# Patient Record
Sex: FEMALE | Race: WHITE | NOT HISPANIC OR LATINO | Employment: UNEMPLOYED | URBAN - METROPOLITAN AREA
[De-identification: names, ages, dates, MRNs, and addresses within clinical notes are randomized per-mention and may not be internally consistent; named-entity substitution may affect disease eponyms.]

---

## 2017-01-25 ENCOUNTER — PATIENT MESSAGE (OUTPATIENT)
Dept: NEUROSURGERY | Facility: CLINIC | Age: 47
End: 2017-01-25

## 2017-03-20 ENCOUNTER — TELEPHONE (OUTPATIENT)
Dept: NEUROSURGERY | Facility: CLINIC | Age: 47
End: 2017-03-20

## 2017-03-20 DIAGNOSIS — Z98.890 STATUS POST LAMINECTOMY: ICD-10-CM

## 2017-03-20 DIAGNOSIS — D49.7 SPINAL CORD TUMOR: Primary | ICD-10-CM

## 2017-03-20 NOTE — TELEPHONE ENCOUNTER
----- Message from Mary Carmen Hemphill sent at 3/20/2017  2:29 PM CDT -----  Contact: Patient [phone]  Patient is calling to speak with nurse about some severe pain she is having. Please call

## 2017-03-24 ENCOUNTER — OFFICE VISIT (OUTPATIENT)
Dept: NEUROSURGERY | Facility: CLINIC | Age: 47
End: 2017-03-24
Payer: COMMERCIAL

## 2017-03-24 ENCOUNTER — HOSPITAL ENCOUNTER (OUTPATIENT)
Dept: RADIOLOGY | Facility: HOSPITAL | Age: 47
Discharge: HOME | End: 2017-03-24
Attending: NEUROLOGICAL SURGERY
Payer: COMMERCIAL

## 2017-03-24 VITALS
WEIGHT: 262.31 LBS | SYSTOLIC BLOOD PRESSURE: 146 MMHG | HEART RATE: 93 BPM | HEIGHT: 64 IN | TEMPERATURE: 99 F | BODY MASS INDEX: 44.78 KG/M2 | DIASTOLIC BLOOD PRESSURE: 87 MMHG

## 2017-03-24 DIAGNOSIS — D49.7 SPINAL CORD TUMOR: ICD-10-CM

## 2017-03-24 DIAGNOSIS — Z98.890 STATUS POST LAMINECTOMY: ICD-10-CM

## 2017-03-24 DIAGNOSIS — Z98.890 H/O LAMINECTOMY: ICD-10-CM

## 2017-03-24 DIAGNOSIS — S22.000A THORACIC COMPRESSION FRACTURE, CLOSED, INITIAL ENCOUNTER: Primary | ICD-10-CM

## 2017-03-24 PROCEDURE — 72070 X-RAY EXAM THORAC SPINE 2VWS: CPT | Mod: TC

## 2017-03-24 PROCEDURE — 72100 X-RAY EXAM L-S SPINE 2/3 VWS: CPT | Mod: TC

## 2017-03-24 PROCEDURE — 99214 OFFICE O/P EST MOD 30 MIN: CPT | Mod: S$GLB,,, | Performed by: PHYSICIAN ASSISTANT

## 2017-03-24 PROCEDURE — 72100 X-RAY EXAM L-S SPINE 2/3 VWS: CPT | Mod: 26,,, | Performed by: RADIOLOGY

## 2017-03-24 PROCEDURE — 72120 X-RAY BEND ONLY L-S SPINE: CPT | Mod: 26,,, | Performed by: RADIOLOGY

## 2017-03-24 PROCEDURE — 72070 X-RAY EXAM THORAC SPINE 2VWS: CPT | Mod: 26,,, | Performed by: RADIOLOGY

## 2017-03-24 PROCEDURE — 99999 PR PBB SHADOW E&M-EST. PATIENT-LVL IV: CPT | Mod: PBBFAC,,, | Performed by: PHYSICIAN ASSISTANT

## 2017-03-24 RX ORDER — ARM BRACE
1 EACH MISCELLANEOUS DAILY
COMMUNITY
Start: 2017-03-24 | End: 2017-03-24 | Stop reason: SDUPTHER

## 2017-03-24 RX ORDER — ARM BRACE
1 EACH MISCELLANEOUS DAILY
Qty: 1 EACH | Refills: 0 | Status: SHIPPED | OUTPATIENT
Start: 2017-03-24 | End: 2019-03-25

## 2017-03-24 NOTE — MR AVS SNAPSHOT
[facility] - Neurosurgery   [address]  [facility] [address]  Phone: [phone]                  Tiffanie Espinoza   3/24/2017 2:20 PM   Office Visit    Description:  Female : 1970   Provider:  MORIAH Hawthorne   Department:  [facility] - Neurosurgery 7th Fl           Reason for Visit     Lumbar Spine Pain (L-Spine)           Diagnoses this Visit        Comments    Thoracic compression fracture, closed, initial encounter    -  Primary     H/O laminectomy                To Do List           Goals (5 Years of Data)     None       These Medications        Disp Refills Start End    back brace Misc 1 each 0 3/24/2017     1 Units by Misc.(Non-Drug; Combo Route) route once daily. - Misc.(Non-Drug; Combo Route)    Pharmacy: [facility] [place] [facility] #: [phone]       Notes to Pharmacy: TLSO Brace      [facility] On Call     [facility] On Call Nurse Care Line -  Assistance  Registered nurses in the [facility] On Call Center provide clinical advisement, health education, appointment booking, and other advisory services.  Call for this free service at [phone].             Medications           Message regarding Medications     Verify the changes and/or additions to your medication regime listed below are the same as discussed with your clinician today.  If any of these changes or additions are incorrect, please notify your healthcare provider.        START taking these NEW medications        Refills    back brace Misc 0    Si Units by Misc.(Non-Drug; Combo Route) route once daily.    Class: Print    Route: Misc.(Non-Drug; Combo Route)           Verify that the below list of medications is an accurate representation of the medications you are currently taking.  If none reported, the list may be blank. If incorrect, please contact your healthcare provider. Carry this list with you in case of emergency.           Current Medications      "atorvastatin (LIPITOR) 20 MG tablet Take 20 mg by mouth once daily.    diazePAM (VALIUM) 5 MG tablet Take 1 tablet (5 mg total) by mouth every 6 (six) hours as needed (muscle spasm).    insulin aspart (NOVOLOG FLEXPEN) 100 unit/mL InPn pen 8 units before breakfast  10 units before lunch and 10 units before dinner    insulin needles, disposable, 32 x 5/32 " Ndle 1 each by Misc.(Non-Drug; Combo Route) route 3 (three) times daily.    ONETOUCH DELICA LANCETS 33 gauge Misc 1 lancet by Misc.(Non-Drug; Combo Route) route 4 (four) times daily.    ONETOUCH VERIO Strp 1 strip by Misc.(Non-Drug; Combo Route) route 4 (four) times daily.    ranitidine (ZANTAC) 75 MG tablet Take 75 mg by mouth as needed for Heartburn. Taking every other day    simethicone (MYLICON) 80 MG chewable tablet Take 80 mg by mouth every 6 (six) hours as needed for Flatulence.    triamcinolone acetonide 0.1% (KENALOG) 0.1 % Lotn     VICTOZA 3-JUANITO 0.6 mg/0.1 mL (18 mg/3 mL) PnIj     back brace Misc 1 Units by Misc.(Non-Drug; Combo Route) route once daily.    insulin detemir (LEVEMIR FLEXTOUCH) 100 unit/mL (3 mL) SubQ InPn pen Inject 30 Units into the skin every evening.           Clinical Reference Information           Your Vitals Were     BP Pulse Temp Height Weight Last Period    146/87 93 98.5 °F (36.9 °C) (Oral) 5' 4" (1.626 m) 119 kg (262 lb 4.8 oz) 03/21/2017    BMI                45.02 kg/m2          Blood Pressure          Most Recent Value    BP  (!)  146/87      Allergies as of 3/24/2017     Ciprofloxacin (Bulk)      Immunizations Administered on Date of Encounter - 3/24/2017     None      Orders Placed During Today's Visit     Future Labs/Procedures Expected by Expires    MRI Lumbar Spine W WO Contrast  3/24/2017 3/24/2018      Smoking Cessation     If you would like to quit smoking:   You may be eligible for free services if you are a [place] resident and started smoking cigarettes before September 1, 1988.  Call the Smoking Cessation Trust " (SCT) toll free at (127) 933-9490 or (960) 037-3778.   Call 1-800-QUIT-NOW if you do not meet the above criteria.            Language Assistance Services     ATTENTION: Language assistance services are available, free of charge. Please call [phone].      ATENCIÓN: Si habla margarita, tiene a corrales disposición servicios gratuitos de asistencia lingüística. Llame al [phone].     CHÚ Ý: N?u b?n nói Ti?ng Vi?t, có các d?ch v? h? tr? ngôn ng? mi?n phí dành cho b?n. G?i s? [phone].         [facility] [address] complies with applicable Federal civil rights laws and does not discriminate on the basis of race, color, national origin, age, disability, or sex.

## 2017-03-24 NOTE — LETTER
March 30, 2017      Lexus Price, FNP  [address]  [place] [address]           [facility] - Neurosurgery 7th Fl  [address]  [facility] [address]  Phone: [phone]          Patient: Tiffanie Espinoza   MR Number: [id number]   YOB: 1970   Date of Visit: 3/24/2017       Dear Lexus Price:    Thank you for referring Tiffanie Espinoza to me for evaluation. Attached you will find relevant portions of my assessment and plan of care.    If you have questions, please do not hesitate to call me. I look forward to following Tiffanie Espinoza along with you.    Sincerely,    MORIAH Hawthorne    Enclosure  CC:  No Recipients    If you would like to receive this communication electronically, please contact [email or web address] or [phone] to request more information on [employer] Link access.    For providers and/or their staff who would like to refer a patient to [facility], please contact us through our one-stop-shop provider referral line, [facility] , at [phone].    If you feel you have received this communication in error or would no longer like to receive these types of communications, please e-mail [email or web address]

## 2017-03-25 NOTE — PROGRESS NOTES
CHIEF COMPLAINT:  Increased back pain.    HISTORY OF PRESENT ILLNESS:  Ms. Espinoza is a 46-year-old female with history of a   thoracic laminectomy for resection of intrathoracic cystic lesion that was   resected by Dr. Yadav on 11/17/2015.  Pathology was negative for malignancy.  She   has been followed postoperatively by Dr. Yadav, was last seen on 12/06/2016.  At   that time, the patient had some complaints of continued left lower extremity   weakness and intermittent urinary incontinence.  The MRI showed residual cyst   without new growth and size of the cyst had decreased postoperatively.  The   patient was scheduled for followup in one year with MRI.  Since last visit, she   does report increased mid back pain over the past two weeks.  Primarily, she   states that she did have a fall around [place] where she tripped on a root on   a [place] parade and fell on her back.  She had some mild increase of   discomfort at that time.  However, two weeks ago, she was at home and says she   was doing normal daily activities and had a sharp increase of mid back pain.  It   has somewhat subsided, but still worse than it had been.  She reports some   continued paresthesias in her legs as well as continued left-sided weakness more   or less unchanged compared to prior.  Denies any bowel or bladder changes.    PHYSICAL EXAMINATION:  GENERAL:  The patient is awake, alert, oriented.  NEUROLOGIC:  Cranial nerves II through XII are grossly intact.  Her gait is   normal.  She does have some mild midline tenderness to palpation in the middle   of the thoracic spine just above the prior surgery.  Strength in her lower   extremity is decreased at 4/5 in left dorsiflexion, otherwise 5/5.  She has   negative ankle clonus.  Toes are downgoing.    IMAGING:  X-rays of the thoracic spine that were done today show mild   compression deformity at T9, which was not present on prior studies.    ASSESSMENT AND PLAN:  Ms. Espnioza is a 46-year-old  female with history of thoracic   laminectomy for excisional biopsy of terminal conus cystic lesion now with mild   T9 compression fracture.  The patient is neurologically stable without any new   or worsening weakness.  Her primary complaint is increased back pain.  I will   give her a prescription for a TLSO brace.  She was advised to wear it at all   times except when flat in bed.  We will order an MRI of the thoracic spine with   and without contrast to further evaluate the fracture.  We will see her back   after the MRI is done.  She is encouraged to call the clinic with questions or   concerns in the meantime.    DICTATED BY:  GUSTAVO Nance Jr./EJ  dd: 03/24/2017 16:47:13 (CDT)  td: 03/25/2017 06:14:53 (CDT)  Doc ID   #[id number]  Job ID #[id number]    CC:

## 2017-03-28 ENCOUNTER — TELEPHONE (OUTPATIENT)
Dept: NEUROSURGERY | Facility: CLINIC | Age: 47
End: 2017-03-28

## 2017-03-28 DIAGNOSIS — D49.7 SPINAL CORD TUMOR: Primary | ICD-10-CM

## 2017-03-28 NOTE — TELEPHONE ENCOUNTER
----- Message from Arin Moya sent at 3/28/2017  9:43 AM CDT -----  Contact: [facility]    [phone]  Calling to req an order and appt for pt to have a lab for her kratanin level.  Pt is schedule to have her mri on Saturday 4-1-17.

## 2017-04-01 ENCOUNTER — HOSPITAL ENCOUNTER (OUTPATIENT)
Dept: RADIOLOGY | Facility: HOSPITAL | Age: 47
Discharge: HOME | End: 2017-04-01
Attending: NEUROLOGICAL SURGERY
Payer: COMMERCIAL

## 2017-04-01 DIAGNOSIS — Z98.890 H/O LAMINECTOMY: ICD-10-CM

## 2017-04-01 DIAGNOSIS — S22.000A THORACIC COMPRESSION FRACTURE, CLOSED, INITIAL ENCOUNTER: ICD-10-CM

## 2017-04-01 RX ORDER — GADOBUTROL 604.72 MG/ML
INJECTION INTRAVENOUS
Status: DISCONTINUED
Start: 2017-04-01 | End: 2017-04-01 | Stop reason: WASHOUT

## 2017-04-04 ENCOUNTER — PATIENT MESSAGE (OUTPATIENT)
Dept: NEUROSURGERY | Facility: CLINIC | Age: 47
End: 2017-04-04

## 2017-04-13 ENCOUNTER — HOSPITAL ENCOUNTER (OUTPATIENT)
Dept: RADIOLOGY | Facility: HOSPITAL | Age: 47
Discharge: HOME | End: 2017-04-13
Attending: NEUROLOGICAL SURGERY
Payer: COMMERCIAL

## 2017-04-13 PROCEDURE — 72158 MRI LUMBAR SPINE W/O & W/DYE: CPT | Mod: 26,,, | Performed by: RADIOLOGY

## 2017-04-13 PROCEDURE — A9585 GADOBUTROL INJECTION: HCPCS | Performed by: NEUROLOGICAL SURGERY

## 2017-04-13 PROCEDURE — 72158 MRI LUMBAR SPINE W/O & W/DYE: CPT | Mod: TC

## 2017-04-13 PROCEDURE — 25500020 PHARM REV CODE 255: Performed by: NEUROLOGICAL SURGERY

## 2017-04-13 RX ORDER — GADOBUTROL 604.72 MG/ML
10 INJECTION INTRAVENOUS
Status: COMPLETED | OUTPATIENT
Start: 2017-04-13 | End: 2017-04-13

## 2017-04-13 RX ADMIN — GADOBUTROL 10 ML: 604.72 INJECTION INTRAVENOUS at 01:04

## 2017-04-17 ENCOUNTER — PATIENT MESSAGE (OUTPATIENT)
Dept: NEUROSURGERY | Facility: CLINIC | Age: 47
End: 2017-04-17

## 2017-04-20 ENCOUNTER — OFFICE VISIT (OUTPATIENT)
Dept: NEUROSURGERY | Facility: CLINIC | Age: 47
End: 2017-04-20
Payer: COMMERCIAL

## 2017-04-20 VITALS
HEIGHT: 64 IN | BODY MASS INDEX: 43.64 KG/M2 | TEMPERATURE: 99 F | DIASTOLIC BLOOD PRESSURE: 91 MMHG | SYSTOLIC BLOOD PRESSURE: 138 MMHG | HEART RATE: 98 BPM | WEIGHT: 255.63 LBS

## 2017-04-20 DIAGNOSIS — Z98.890 H/O LAMINECTOMY: Primary | ICD-10-CM

## 2017-04-20 DIAGNOSIS — D49.2 THORACIC SPINE TUMOR: ICD-10-CM

## 2017-04-20 PROCEDURE — 99214 OFFICE O/P EST MOD 30 MIN: CPT | Mod: S$GLB,,, | Performed by: PHYSICIAN ASSISTANT

## 2017-04-20 PROCEDURE — 99999 PR PBB SHADOW E&M-EST. PATIENT-LVL IV: CPT | Mod: PBBFAC,,, | Performed by: PHYSICIAN ASSISTANT

## 2017-04-20 NOTE — LETTER
April 23, 2017      Angel Angel MD  [address]  [address]  [facility] [place] [address]           [facility] - Neurosurgery 7th Fl  [address]  [facility] [address]  Phone: [phone]          Patient: Tiffanie Espinoza   MR Number: [id number]   YOB: 1970   Date of Visit: 4/20/2017       Dear Dr. Angel Angel:    Thank you for referring Tiffanie Espinoza to me for evaluation. Attached you will find relevant portions of my assessment and plan of care.    If you have questions, please do not hesitate to call me. I look forward to following Tiffanie Espinoza along with you.    Sincerely,    MORIAH Hawthorne    Enclosure  CC:  No Recipients    If you would like to receive this communication electronically, please contact [email or web address] or [phone] to request more information on [employer] Link access.    For providers and/or their staff who would like to refer a patient to [facility], please contact us through our one-stop-shop provider referral line, [facility], at [phone].    If you feel you have received this communication in error or would no longer like to receive these types of communications, please e-mail [email or web address]

## 2017-04-21 NOTE — PROGRESS NOTES
CHIEF COMPLAINT:  Thoracic compression deformity.    HISTORY OF PRESENT ILLNESS:  Ms. Espinoza is a 47-year-old female with history of   thoracic laminectomy for resection of intrathoracic cystic lesion that was   resected by Dr. Yadav on 11/17/2015.  Pathology was negative for malignancy.  She   presented to me on 03/24/2017 with increased mid back pain.  She had had a fall   around [place] where she tripped and fell on her back.  X-ray at that time   showed mild compression deformity at T9.  She was neurologically intact.  Again   has baseline longstanding left dorsiflexion weakness.  Otherwise, no new issues   related to the fall.  We got her a TLSO brace and scheduled for an MRI scan.    She is here today.  She reports overall gradually improving back pain.  She does   have continued stable back pain that has not completely gone away since   surgery.  She denies any new pain, paresthesias or weakness in her lower   extremities.  She denies balance difficulties, has weakness in her legs.  She   denies bowel or bladder dysfunction.    PHYSICAL EXAMINATION:  GENERAL:  The patient is awake, alert, oriented, in no apparent distress.  NEUROLOGIC:  Cranial nerves II through XII are grossly intact.  Her gait is   normal.  Her strength remains decreased at left dorsiflexion at 4/5.  Otherwise,   her strength is full.  Toes are downgoing.  Negative ankle clonus.  Sensation   is intact.    IMAGING:  MRI of the lumbar spine that was obtained on 04/13/2017 that includes   the lower thoracic spine shows stable changes of the cystic lesion at T12-L1 as   well as no evidence of acute fracture at T9.  The spinal canal does not exhibit   any significant central or foraminal stenosis.    ASSESSMENT AND PLAN:  Ms. Espinoza is a 47-year-old female with history of   laminectomy for excisional biopsy of terminal conus cystic lesion with mild T9   compression fracture.  MRI does not show any STIR signal to suggest acute   fracture.  The  patient's back pain is back to her normal baseline.  She remains   neurologically intact.  She can wear the TLSO brace only for comfort.  Discussed   with patient about smoking cessation as well as weight loss as far as helping   to improve her back pain and gave her a prescription for physical therapy.  We   will, however, plan to have her follow up as scheduled in one year with a repeat   MRI with Dr. Yadav.  She was encouraged to call our clinic with any worsening   problems or questions or concerns in the meantime.    DICTATED BY:  Salvador Sethi Jr., GUSTAVO.      TRACI/EJ  dd: 04/20/2017 14:36:09 (CDT)  td: 04/21/2017 03:01:42 (CDT)  Doc ID   #[id number]  Job ID #[id number]    CC:

## 2017-10-11 ENCOUNTER — PATIENT MESSAGE (OUTPATIENT)
Dept: NEUROSURGERY | Facility: CLINIC | Age: 47
End: 2017-10-11

## 2017-10-11 ENCOUNTER — TELEPHONE (OUTPATIENT)
Dept: NEUROSURGERY | Facility: CLINIC | Age: 47
End: 2017-10-11

## 2017-10-11 DIAGNOSIS — M54.9 BACK PAIN, UNSPECIFIED BACK LOCATION, UNSPECIFIED BACK PAIN LATERALITY, UNSPECIFIED CHRONICITY: Primary | ICD-10-CM

## 2017-11-14 ENCOUNTER — OFFICE VISIT (OUTPATIENT)
Dept: NEUROSURGERY | Facility: CLINIC | Age: 47
End: 2017-11-14
Payer: COMMERCIAL

## 2017-11-14 ENCOUNTER — HOSPITAL ENCOUNTER (OUTPATIENT)
Dept: RADIOLOGY | Facility: HOSPITAL | Age: 47
Discharge: HOME | End: 2017-11-14
Attending: NEUROLOGICAL SURGERY
Payer: COMMERCIAL

## 2017-11-14 VITALS
WEIGHT: 247 LBS | DIASTOLIC BLOOD PRESSURE: 80 MMHG | SYSTOLIC BLOOD PRESSURE: 145 MMHG | TEMPERATURE: 99 F | BODY MASS INDEX: 42.17 KG/M2 | HEART RATE: 89 BPM | HEIGHT: 64 IN

## 2017-11-14 DIAGNOSIS — D49.2 THORACIC SPINE TUMOR: Primary | ICD-10-CM

## 2017-11-14 DIAGNOSIS — G95.9 CERVICAL MYELOPATHY: ICD-10-CM

## 2017-11-14 DIAGNOSIS — M54.9 BACK PAIN, UNSPECIFIED BACK LOCATION, UNSPECIFIED BACK PAIN LATERALITY, UNSPECIFIED CHRONICITY: ICD-10-CM

## 2017-11-14 PROCEDURE — 72070 X-RAY EXAM THORAC SPINE 2VWS: CPT | Mod: 26,,, | Performed by: RADIOLOGY

## 2017-11-14 PROCEDURE — 99214 OFFICE O/P EST MOD 30 MIN: CPT | Mod: S$GLB,,, | Performed by: PHYSICIAN ASSISTANT

## 2017-11-14 PROCEDURE — 72070 X-RAY EXAM THORAC SPINE 2VWS: CPT | Mod: TC

## 2017-11-14 PROCEDURE — 3008F BODY MASS INDEX DOCD: CPT | Mod: CPTII,S$GLB,, | Performed by: PHYSICIAN ASSISTANT

## 2017-11-14 PROCEDURE — 99999 PR PBB SHADOW E&M-EST. PATIENT-LVL IV: CPT | Mod: PBBFAC,,, | Performed by: PHYSICIAN ASSISTANT

## 2017-11-14 RX ORDER — OXYCODONE AND ACETAMINOPHEN 10; 325 MG/1; MG/1
1 TABLET ORAL
COMMUNITY
Start: 2017-10-23 | End: 2017-11-22

## 2017-11-14 RX ORDER — TIZANIDINE 4 MG/1
4 TABLET ORAL
COMMUNITY
Start: 2017-08-24 | End: 2017-12-04

## 2017-11-14 RX ORDER — GABAPENTIN 300 MG/1
300 CAPSULE ORAL
COMMUNITY
Start: 2017-08-24 | End: 2017-12-04

## 2017-11-14 RX ORDER — ASPIRIN 325 MG
TABLET, DELAYED RELEASE (ENTERIC COATED) ORAL
Refills: 0 | COMMUNITY
Start: 2017-08-22 | End: 2018-10-05

## 2017-11-14 RX ORDER — METFORMIN HYDROCHLORIDE 500 MG/1
1000 TABLET, EXTENDED RELEASE ORAL
COMMUNITY
Start: 2017-09-28 | End: 2018-09-28

## 2017-11-14 NOTE — PROGRESS NOTES
CHIEF COMPLAINT:  History of thoracic cystic lesion.    HISTORY OF PRESENT ILLNESS:  Ms. Tiffanie Espinoza is a 47-year-old female with   history of thoracic laminectomy at T12 for resection of intradural   intramedullary thoracic cyst that was done by Dr. Yadav on 08/13/2015.  The   patient subsequently had wound dehiscence requiring a revision a month later on   11/24/2015, pathology from the lesion was negative.  The patient has baseline   left dorsiflexion weakness, otherwise strength has been stable.  I last saw her   on 11/15/2017.  At that time, the patient had some questionable signs of   cervical myelopathy as well as continued back pain.  It has been a year since   her previous imaging studies, so we had her back with an MRI of her cervical and   thoracic spine today.  Currently, she denies any new complaints.  She reports   continued intermittent paresthesias and the upper extremities, primarily; on the   right, she denies any new weakness.  Hand function has been stable; balance   problems, unchanged.  Denies bowel or bladder function.    PHYSICAL EXAMINATION:  GENERAL:  The patient is awake, alert, oriented, in no apparent distress.  NEUROLOGIC:  Cranial nerves II-XII are grossly intact.  She has mild difficulty   with tandem gait.  Strength in upper extremities is 5/5 bilaterally in deltoids,   biceps, triceps, wrist flexion, wrist extension and hand .  She has subtle   Lakisha sign bilaterally.  Strength in lower extremities is 5/5 in bilateral   hip flexion, knee flexion, knee extension, dorsiflexion, plantarflexion and EHL   on the left is 4/5 and on the right is full strength at 5/5.  Baseline decreased   sensation to light touch on top of the left foot.    IMAGING:  MRI of the cervical and thoracic spine with and without contrast,   personally reviewed today.    ASSESSMENT AND PLAN:  Ms. Espinoza is a 47-year-old female with history of thoracic   laminectomy and T12 for excisional biopsy of terminal  clonus cystic lesion in   August of 2005.  Pathology was negative for malignancy.  The patient's MRI scans   today; thoracic MRI shows stable size of conus lesion compared to previous   study.  There are no new lesions appreciable on the thoracic spine.  MRI of the   cervical spine was also obtained today, shows mild multilevel degenerative   changes.  There is a paracentral disk herniation at C3-C4 on the right causing a   mild-to-moderate degree of foraminal stenosis.  This is also the case at C4-C5   to lesser degree.  No evidence of enhancing lesions in the cervical cord, no   significant degree of central canal stenosis.  At this point in time, we will   have her continue to follow up with Pain Management and discussed her that we   can get her in to another round of physical therapy since she does continue to   deal with intermittent back pain.  She wants to hold off on this at this time.    From our standpoint, we will plan on seeing her back in Neurosurgery Clinic in   one year with repeat imaging of the thoracic spine.  Currently, she is   neurologically stable with intermittent paresthesias in her upper extremities.    No focal weakness in her arms.  Her small disk herniation will be retreated   conservatively.  She is advised if she develops any new or worsening problems,   to give us a call in the meantime.  Otherwise, we will see her back in a year.    DICTATED BY:  Salvador Sethi Jr., GUSTAVO AVILES/IN  dd: 12/04/2017 09:07:24 (CST)  td: 12/05/2017 05:46:49 (CST)  Doc ID   #[id number]  Job ID #[id number]    CC:

## 2017-11-14 NOTE — LETTER
November 14, 2017      Angel Angel MD  [address]  [address]  [facility] [place] [address]           [facility] - Neurosurgery 7th Fl  [address]  [facility] [address]  Phone: [phone]          Patient: Tiffanie Espinoza   MR Number: [id number]   YOB: 1970   Date of Visit: 11/14/2017       Dear Dr. Angel Angel:    Thank you for referring Tiffanie Espinoza to me for evaluation. Attached you will find relevant portions of my assessment and plan of care.    If you have questions, please do not hesitate to call me. I look forward to following Tiffanie Espinoza along with you.    Sincerely,    MORIAH Hawthorne    Enclosure  CC:  No Recipients    If you would like to receive this communication electronically, please contact [email or web address] or [phone] to request more information on [employer] Link access.    For providers and/or their staff who would like to refer a patient to [facility], please contact us through our one-stop-shop provider referral line, [facility], at [phone].    If you feel you have received this communication in error or would no longer like to receive these types of communications, please e-mail [email or web address]

## 2017-11-15 NOTE — PROGRESS NOTES
CHIEF COMPLAINT:  Balance difficulty and history of cystic spinal cord lesion.    HISTORY OF PRESENT ILLNESS:  Ms. 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 Yadav on 08/13/2015.  The patient subsequently   had wound dehiscence and requiring revision a month later on 11/24/2015.    Pathology from the lesion was negative for malignancy.  The patient overall has   done well postoperatively.  She has had baseline left dorsiflexion weakness;   otherwise, has improved.  I last saw her on 04/20/2017.  At that time, the   patient had a fall from the back of a truck, sustaining a mild T9 compression   fracture.  At that visit, her strength was stable.  She had a repeat MRI that   was not consistent with an acute fracture.  Additionally, the MRI scan showed   stable cystic lesion at the terminus of the conus compared to the previous   study.  The patient comes in today for several complaints that have all occurred   over the past month.  She states that she has noticed increased pain and   paresthesias in the hands bilaterally.  She also noticed some of the function of   her hands has difficulty, dropping things and writing.  She reports increased   numbness in the right foot that has also accompanied with shooting pain   intermittently.  The patient states that in addition to this about a month ago   she has had three to four episodes of urinary incontinence, one of them was   related when she was awake after sneezing, however, another occurred in the   middle of the night and she woke up and the bed was wet.  Again, this has   happened in the past month, three to four times.  Currently, she feels her   strength is overall about the same, but she does feel that her balance is   somewhat worsening.    PHYSICAL EXAMINATION:  GENERAL:  The patient is awake, alert, oriented, in no apparent  distress.  NEUROLOGIC:  Pupils are equal, round and reactive to light.  Extraocular   movements are intact.  Face is symmetric.  Tongue is midline.  Her gait is she   favors the right side.  She does have difficulty with tandem gait.  Strength in   her upper extremities is 5/5 at deltoids, biceps, triceps, wrist flexion, wrist   extension and 4+/5 in handgrip.  She has bilateral Lakisha sign.  Strength in   her lower extremities is 5/5 at hip flexion, knee flexion, knee extension,   dorsiflexion, plantar flexion on the right, but plantar flexion on the left is   4/5, EHL 4/5, plantar flexion on the right is 5/5.  She has one beat of ankle   clonus on the right, two on the left.  Toes are downgoing.  Decreased sensation   to light touch on the left foot.    IMAGING:  No recent.    ASSESSMENT AND PLAN:  Ms. Espinoza is a 47-year-old female with history of thoracic   laminectomy at T12 for excisional biopsy of terminal clonus cystic lesion that   was done in August 2015.  Pathology was negative for malignancy.  The patient is   now presenting with increased hand numbness as well as intrinsic hand   difficulty.  She has a positive Lakisha sign, difficulty with tandem gait.    Given her history and clinical findings, we will get an updated MRI scan of the   thoracic spine and also an MRI scan of the cervical spine.  She has signs and   symptoms concerning for cervical myelopathy.  We will do this with and without   contrast.  We will see her back after this is done.  She was encouraged to call   the clinic with questions or concerns in the meantime.    DICTATED BY:  Salvador Sethi Jr., GUSTAVO.      LUISA  dd: 11/14/2017 11:00:02 (CST)  td: 11/15/2017 03:19:30 (CST)  Doc ID   #[id number]  Job ID #[id number]    CC:

## 2017-12-04 ENCOUNTER — OFFICE VISIT (OUTPATIENT)
Dept: NEUROSURGERY | Facility: CLINIC | Age: 47
End: 2017-12-04
Payer: COMMERCIAL

## 2017-12-04 ENCOUNTER — HOSPITAL ENCOUNTER (OUTPATIENT)
Dept: RADIOLOGY | Facility: HOSPITAL | Age: 47
Discharge: HOME | End: 2017-12-04
Attending: PHYSICIAN ASSISTANT
Payer: COMMERCIAL

## 2017-12-04 VITALS
TEMPERATURE: 87 F | DIASTOLIC BLOOD PRESSURE: 79 MMHG | SYSTOLIC BLOOD PRESSURE: 122 MMHG | HEIGHT: 64 IN | BODY MASS INDEX: 42.17 KG/M2 | WEIGHT: 247 LBS

## 2017-12-04 DIAGNOSIS — D49.2 THORACIC SPINE TUMOR: ICD-10-CM

## 2017-12-04 DIAGNOSIS — G95.9 CERVICAL MYELOPATHY: ICD-10-CM

## 2017-12-04 DIAGNOSIS — Z98.890 S/P LAMINECTOMY: Primary | ICD-10-CM

## 2017-12-04 PROCEDURE — 3008F BODY MASS INDEX DOCD: CPT | Mod: CPTII,S$GLB,, | Performed by: PHYSICIAN ASSISTANT

## 2017-12-04 PROCEDURE — 72157 MRI CHEST SPINE W/O & W/DYE: CPT | Mod: 26,,, | Performed by: RADIOLOGY

## 2017-12-04 PROCEDURE — 99999 PR PBB SHADOW E&M-EST. PATIENT-LVL IV: CPT | Mod: PBBFAC,,, | Performed by: PHYSICIAN ASSISTANT

## 2017-12-04 PROCEDURE — 99214 OFFICE O/P EST MOD 30 MIN: CPT | Mod: S$GLB,,, | Performed by: PHYSICIAN ASSISTANT

## 2017-12-04 PROCEDURE — 72156 MRI NECK SPINE W/O & W/DYE: CPT | Mod: TC

## 2017-12-04 PROCEDURE — 72157 MRI CHEST SPINE W/O & W/DYE: CPT | Mod: TC

## 2017-12-04 PROCEDURE — 25500020 PHARM REV CODE 255: Performed by: PHYSICIAN ASSISTANT

## 2017-12-04 PROCEDURE — 72156 MRI NECK SPINE W/O & W/DYE: CPT | Mod: 26,,, | Performed by: RADIOLOGY

## 2017-12-04 PROCEDURE — A9585 GADOBUTROL INJECTION: HCPCS | Performed by: PHYSICIAN ASSISTANT

## 2017-12-04 RX ORDER — LANOLIN ALCOHOL/MO/W.PET/CERES
1000 CREAM (GRAM) TOPICAL
COMMUNITY
Start: 2017-05-03 | End: 2018-05-03

## 2017-12-04 RX ORDER — DIAZEPAM 5 MG/1
5 TABLET ORAL
COMMUNITY
Start: 2017-08-24 | End: 2019-03-25

## 2017-12-04 RX ORDER — LISINOPRIL AND HYDROCHLOROTHIAZIDE 12.5; 2 MG/1; MG/1
1 TABLET ORAL DAILY
Refills: 1 | COMMUNITY
Start: 2017-11-19 | End: 2019-03-25

## 2017-12-04 RX ORDER — TIZANIDINE 4 MG/1
4 TABLET ORAL
COMMUNITY
Start: 2017-11-22 | End: 2018-10-05

## 2017-12-04 RX ORDER — GLIMEPIRIDE 4 MG/1
4 TABLET ORAL
COMMUNITY
Start: 2017-09-28 | End: 2019-03-25

## 2017-12-04 RX ORDER — METOPROLOL SUCCINATE 25 MG/1
25 TABLET, EXTENDED RELEASE ORAL
COMMUNITY
Start: 2017-05-03 | End: 2018-05-03

## 2017-12-04 RX ORDER — GABAPENTIN 300 MG/1
300 CAPSULE ORAL
COMMUNITY
Start: 2017-11-22 | End: 2018-11-22

## 2017-12-04 RX ORDER — OXYCODONE AND ACETAMINOPHEN 10; 325 MG/1; MG/1
1 TABLET ORAL
COMMUNITY
Start: 2018-01-21 | End: 2018-02-20

## 2017-12-04 RX ORDER — GADOBUTROL 604.72 MG/ML
10 INJECTION INTRAVENOUS
Status: COMPLETED | OUTPATIENT
Start: 2017-12-04 | End: 2017-12-04

## 2017-12-04 RX ADMIN — GADOBUTROL 10 ML: 604.72 INJECTION INTRAVENOUS at 07:12

## 2017-12-05 ENCOUNTER — PATIENT MESSAGE (OUTPATIENT)
Dept: NEUROSURGERY | Facility: CLINIC | Age: 47
End: 2017-12-05

## 2018-07-31 ENCOUNTER — PATIENT MESSAGE (OUTPATIENT)
Dept: NEUROSURGERY | Facility: CLINIC | Age: 48
End: 2018-07-31

## 2018-08-01 ENCOUNTER — PATIENT MESSAGE (OUTPATIENT)
Dept: NEUROSURGERY | Facility: CLINIC | Age: 48
End: 2018-08-01

## 2018-08-01 ENCOUNTER — TELEPHONE (OUTPATIENT)
Dept: NEUROSURGERY | Facility: CLINIC | Age: 48
End: 2018-08-01

## 2018-08-01 DIAGNOSIS — D49.2 THORACIC SPINE TUMOR: Primary | ICD-10-CM

## 2018-08-01 DIAGNOSIS — Z98.890 S/P LAMINECTOMY: ICD-10-CM

## 2018-08-01 NOTE — TELEPHONE ENCOUNTER
patient having mostly thoracic pain but also has complaints of cervical pain worse than before. Advised I can get her an appointment but will be 2-3 weeks away. Will schedule and put a copy of appt card in the mail. Patient can also see via portal

## 2018-08-27 ENCOUNTER — TELEPHONE (OUTPATIENT)
Dept: NEUROSURGERY | Facility: CLINIC | Age: 48
End: 2018-08-27

## 2018-08-27 NOTE — TELEPHONE ENCOUNTER
----- Message from Abdoul Guajardo sent at 2018  4:27 PM CDT -----  Needs Advice    Reason for call: MRIs on 10/04 may need new referrals, due to Epic saying referrals will  in September.    Communication Preference: [phone]  Additional Information:

## 2018-08-31 ENCOUNTER — TELEPHONE (OUTPATIENT)
Dept: NEUROSURGERY | Facility: CLINIC | Age: 48
End: 2018-08-31

## 2018-08-31 DIAGNOSIS — Z98.890 S/P LAMINECTOMY: ICD-10-CM

## 2018-08-31 DIAGNOSIS — D49.2 THORACIC SPINE TUMOR: Primary | ICD-10-CM

## 2018-10-05 ENCOUNTER — OFFICE VISIT (OUTPATIENT)
Dept: NEUROSURGERY | Facility: CLINIC | Age: 48
End: 2018-10-05
Attending: NEUROLOGICAL SURGERY
Payer: COMMERCIAL

## 2018-10-05 ENCOUNTER — HOSPITAL ENCOUNTER (OUTPATIENT)
Dept: RADIOLOGY | Facility: HOSPITAL | Age: 48
Discharge: HOME | End: 2018-10-05
Attending: NEUROLOGICAL SURGERY
Payer: COMMERCIAL

## 2018-10-05 VITALS
BODY MASS INDEX: 42.79 KG/M2 | WEIGHT: 249.31 LBS | TEMPERATURE: 98 F | SYSTOLIC BLOOD PRESSURE: 131 MMHG | DIASTOLIC BLOOD PRESSURE: 69 MMHG | HEART RATE: 85 BPM

## 2018-10-05 DIAGNOSIS — Z98.890 S/P LAMINECTOMY: ICD-10-CM

## 2018-10-05 DIAGNOSIS — M54.16 LUMBAR RADICULOPATHY: Primary | ICD-10-CM

## 2018-10-05 DIAGNOSIS — D49.2 THORACIC SPINE TUMOR: ICD-10-CM

## 2018-10-05 DIAGNOSIS — Z98.890 H/O LAMINECTOMY: ICD-10-CM

## 2018-10-05 DIAGNOSIS — M21.372 FOOT DROP, LEFT: ICD-10-CM

## 2018-10-05 PROCEDURE — 72157 MRI CHEST SPINE W/O & W/DYE: CPT | Mod: TC

## 2018-10-05 PROCEDURE — 72156 MRI NECK SPINE W/O & W/DYE: CPT | Mod: TC

## 2018-10-05 PROCEDURE — 99999 PR PBB SHADOW E&M-EST. PATIENT-LVL IV: CPT | Mod: PBBFAC,,, | Performed by: PHYSICIAN ASSISTANT

## 2018-10-05 PROCEDURE — 99214 OFFICE O/P EST MOD 30 MIN: CPT | Mod: S$GLB,,, | Performed by: PHYSICIAN ASSISTANT

## 2018-10-05 PROCEDURE — 72156 MRI NECK SPINE W/O & W/DYE: CPT | Mod: 26,,, | Performed by: RADIOLOGY

## 2018-10-05 PROCEDURE — A9585 GADOBUTROL INJECTION: HCPCS | Performed by: NEUROLOGICAL SURGERY

## 2018-10-05 PROCEDURE — 72157 MRI CHEST SPINE W/O & W/DYE: CPT | Mod: 26,,, | Performed by: RADIOLOGY

## 2018-10-05 PROCEDURE — 25500020 PHARM REV CODE 255: Performed by: NEUROLOGICAL SURGERY

## 2018-10-05 PROCEDURE — 3008F BODY MASS INDEX DOCD: CPT | Mod: CPTII,S$GLB,, | Performed by: PHYSICIAN ASSISTANT

## 2018-10-05 RX ORDER — CEFUROXIME AXETIL 250 MG/1
TABLET ORAL
COMMUNITY
Start: 2018-06-29 | End: 2019-03-25

## 2018-10-05 RX ORDER — METOPROLOL SUCCINATE 100 MG/1
TABLET, EXTENDED RELEASE ORAL
COMMUNITY
End: 2019-03-25

## 2018-10-05 RX ORDER — ALBUTEROL SULFATE 90 UG/1
2 AEROSOL, METERED RESPIRATORY (INHALATION)
COMMUNITY
Start: 2018-05-14 | End: 2019-05-14

## 2018-10-05 RX ORDER — VIT C/E/ZN/COPPR/LUTEIN/ZEAXAN 250MG-90MG
1 CAPSULE ORAL
COMMUNITY

## 2018-10-05 RX ORDER — TIZANIDINE 4 MG/1
4 TABLET ORAL
COMMUNITY
Start: 2018-08-20 | End: 2019-08-20

## 2018-10-05 RX ORDER — OXYCODONE AND ACETAMINOPHEN 10; 325 MG/1; MG/1
TABLET ORAL
COMMUNITY
Start: 2018-09-19

## 2018-10-05 RX ORDER — GADOBUTROL 604.72 MG/ML
10 INJECTION INTRAVENOUS
Status: COMPLETED | OUTPATIENT
Start: 2018-10-05 | End: 2018-10-05

## 2018-10-05 RX ORDER — FLUOCINONIDE CREAM (EMULSIFIED BASE) 0.5 MG/G
CREAM TOPICAL
COMMUNITY
Start: 2018-02-28 | End: 2019-02-28

## 2018-10-05 RX ADMIN — GADOBUTROL 10 ML: 604.72 INJECTION INTRAVENOUS at 11:10

## 2018-10-05 NOTE — Clinical Note
October 7, 2018      Sedrick Yadav MD  [address]  [facility] [address]           [facility] 7[facility] Fl  [address]  [facility] [address]  Phone: [phone]          Patient: Tiffanie Espinoza   MR Number: [id number]   YOB: 1970   Date of Visit: 10/5/2018       Dear Dr. Sedrick Yadav:    Thank you for referring Tiffanie Espinoza to me for evaluation. Attached you will find relevant portions of my assessment and plan of care.    If you have questions, please do not hesitate to call me. I look forward to following Tiffanie Espinoza along with you.    Sincerely,    MORIAH Hawthorne    Enclosure  CC:  No Recipients    If you would like to receive this communication electronically, please contact [email or web address] or [phone] to request more information on [employer] Link access.    For providers and/or their staff who would like to refer a patient to [facility], please contact us through our one-stop-shop provider referral line, [facility] , at [phone].    If you feel you have received this communication in error or would no longer like to receive these types of communications, please e-mail [email or web address]

## 2018-10-09 ENCOUNTER — PATIENT MESSAGE (OUTPATIENT)
Dept: NEUROSURGERY | Facility: CLINIC | Age: 48
End: 2018-10-09

## 2018-10-17 ENCOUNTER — TELEPHONE (OUTPATIENT)
Dept: NEUROSURGERY | Facility: CLINIC | Age: 48
End: 2018-10-17

## 2018-11-15 ENCOUNTER — TELEPHONE (OUTPATIENT)
Dept: NEUROSURGERY | Facility: CLINIC | Age: 48
End: 2018-11-15

## 2018-11-15 NOTE — TELEPHONE ENCOUNTER
Patient cant make the appointment tomorrow, she called to reschedule. Scheduled for 12/17/2018. She is omayra call back with a fax number to fax order for EMG/NCS to place in [place]

## 2018-11-15 NOTE — TELEPHONE ENCOUNTER
----- Message from Braulio Sher sent at 11/15/2018  8:41 AM CST -----  Contact: Pt. [phone]  Needs Advice    Reason for call:The patient would like to speak to someone regarding rescheduling her appointment. She would like something before 12/20.        Communication Preference:PHONE    Additional Information:

## 2018-12-18 ENCOUNTER — TELEPHONE (OUTPATIENT)
Dept: NEUROLOGY | Facility: CLINIC | Age: 48
End: 2018-12-18

## 2018-12-18 NOTE — TELEPHONE ENCOUNTER
Lemuel for patient to schedule EMG. Called 11/19,11/20,12/4 and 12/18/2016. Left my name and number for a return call.

## 2019-03-25 ENCOUNTER — OFFICE VISIT (OUTPATIENT)
Dept: NEUROSURGERY | Facility: CLINIC | Age: 49
End: 2019-03-25
Payer: COMMERCIAL

## 2019-03-25 ENCOUNTER — HOSPITAL ENCOUNTER (OUTPATIENT)
Dept: RADIOLOGY | Facility: HOSPITAL | Age: 49
Discharge: HOME | End: 2019-03-25
Attending: PHYSICIAN ASSISTANT
Payer: COMMERCIAL

## 2019-03-25 VITALS
HEART RATE: 85 BPM | WEIGHT: 249 LBS | TEMPERATURE: 98 F | DIASTOLIC BLOOD PRESSURE: 81 MMHG | HEIGHT: 64 IN | BODY MASS INDEX: 42.51 KG/M2 | SYSTOLIC BLOOD PRESSURE: 142 MMHG

## 2019-03-25 DIAGNOSIS — M54.16 LUMBAR RADICULOPATHY: ICD-10-CM

## 2019-03-25 DIAGNOSIS — M54.16 LUMBAR RADICULOPATHY: Primary | ICD-10-CM

## 2019-03-25 DIAGNOSIS — Z98.890 H/O LAMINECTOMY: ICD-10-CM

## 2019-03-25 DIAGNOSIS — M54.9 BACK PAIN, UNSPECIFIED BACK LOCATION, UNSPECIFIED BACK PAIN LATERALITY, UNSPECIFIED CHRONICITY: ICD-10-CM

## 2019-03-25 DIAGNOSIS — M21.372 FOOT DROP, LEFT: ICD-10-CM

## 2019-03-25 PROCEDURE — 99999 PR PBB SHADOW E&M-EST. PATIENT-LVL IV: CPT | Mod: PBBFAC,,, | Performed by: PHYSICIAN ASSISTANT

## 2019-03-25 PROCEDURE — 99214 OFFICE O/P EST MOD 30 MIN: CPT | Mod: S$GLB,,, | Performed by: PHYSICIAN ASSISTANT

## 2019-03-25 PROCEDURE — 72158 MRI LUMBAR SPINE W WO CONTRAST: ICD-10-PCS | Mod: 26,,, | Performed by: RADIOLOGY

## 2019-03-25 PROCEDURE — 99214 PR OFFICE/OUTPT VISIT, EST, LEVL IV, 30-39 MIN: ICD-10-PCS | Mod: S$GLB,,, | Performed by: PHYSICIAN ASSISTANT

## 2019-03-25 PROCEDURE — 99999 PR PBB SHADOW E&M-EST. PATIENT-LVL IV: ICD-10-PCS | Mod: PBBFAC,,, | Performed by: PHYSICIAN ASSISTANT

## 2019-03-25 PROCEDURE — 72158 MRI LUMBAR SPINE W/O & W/DYE: CPT | Mod: TC

## 2019-03-25 PROCEDURE — 72158 MRI LUMBAR SPINE W/O & W/DYE: CPT | Mod: 26,,, | Performed by: RADIOLOGY

## 2019-03-25 PROCEDURE — A9585 GADOBUTROL INJECTION: HCPCS | Performed by: PHYSICIAN ASSISTANT

## 2019-03-25 PROCEDURE — 25500020 PHARM REV CODE 255: Performed by: PHYSICIAN ASSISTANT

## 2019-03-25 RX ORDER — OXYCODONE AND ACETAMINOPHEN 10; 325 MG/1; MG/1
1 TABLET ORAL
COMMUNITY
Start: 2019-03-17 | End: 2019-04-16

## 2019-03-25 RX ORDER — GADOBUTROL 604.72 MG/ML
10 INJECTION INTRAVENOUS
Status: COMPLETED | OUTPATIENT
Start: 2019-03-25 | End: 2019-03-25

## 2019-03-25 RX ORDER — OXYCODONE AND ACETAMINOPHEN 10; 325 MG/1; MG/1
1 TABLET ORAL
COMMUNITY
Start: 2019-04-17 | End: 2019-05-17

## 2019-03-25 RX ORDER — GLIMEPIRIDE 2 MG/1
2 TABLET ORAL
COMMUNITY
Start: 2018-12-19 | End: 2019-12-19

## 2019-03-25 RX ORDER — LOSARTAN POTASSIUM 25 MG/1
25 TABLET ORAL
COMMUNITY
Start: 2019-01-07 | End: 2020-01-07

## 2019-03-25 RX ORDER — TIZANIDINE 4 MG/1
4 TABLET ORAL
COMMUNITY
Start: 2019-02-15 | End: 2019-03-25

## 2019-03-25 RX ORDER — FLUTICASONE PROPIONATE 50 MCG
1 SPRAY, SUSPENSION (ML) NASAL
COMMUNITY
Start: 2019-01-07 | End: 2020-01-07

## 2019-03-25 RX ORDER — PRAVASTATIN SODIUM 20 MG/1
20 TABLET ORAL
COMMUNITY
Start: 2018-12-19 | End: 2019-12-19

## 2019-03-25 RX ADMIN — GADOBUTROL 10 ML: 604.72 INJECTION INTRAVENOUS at 08:03

## 2019-03-25 NOTE — PROGRESS NOTES
"[facility]  Neurosurgery  History & Physical    Patient Name: Tiffanie Espinoza  MRN: [id number]  Primary Care Provider: Angel Angel MD    Patient information was obtained from patient and past medical records.     Subjective:     Chief Complaint/Reason for Admission: f/u back pain    History of Present Illness:  Ms. Tiffanie Espinoza is a 47-year-old female with history of thoracic laminectomy at [facility] for resection of intradural intramedullary thoracic cyst (w/ Dr. Yadav on 08/13/2015) w/wound dehiscence and washout/revision (11/24/15)  who presents for follow up of low back pain. Pathology for this lesion was negative for malignancy. Pt was last seen in clinic several months ago by Salvador Sethi at which time she was c/o continued back pain and questionable cervical myelopathy. MRI C/T spine were stable with no significant central canal or NF stenosis. She refused PT at that time. MRI lumbar spine and EMG were ordered, although pt requested to have EMG done with her established neurologist in [place]. She repeatedly no showed for several clinic appointments since that time. Today, she reports continued low back pain, some days worse than others. She denies BLE pain or paresthesias, b/b incontinence, or balance difficulty. She states her "legs feel heavy" and says her left foot "will just stop on me when the back pain is bad". She has mild left foot drop at baseline. She did not obtain EMG or see her neurologist since last visit. She is requesting referral for PT at this time. She follows up with PM and is currently on Zanaflex and Percocet. She has no hx of LIZ's and is not interested in receiving any.         Review of patient's allergies indicates:   Allergen Reactions    Ciprofloxacin Other (See Comments)     Flushing  Nausea  Weak,shaky   Flushing  Nausea  Weak,shaky   Flushing  Nausea  Weak,shaky     Ciprofloxacin (bulk)      Flushing  Nausea  Weak,shaky     Lisinopril Other (See Comments)     " flushing       Past Medical History:   Diagnosis Date    Atypical chest pain 7/31/2015    Diabetes mellitus, type 2     ?    Edema 7/31/2015    History of PSVT (paroxysmal supraventricular tachycardia) 7/31/2015    HLD (hyperlipidemia)     Hypertension     Morbid obesity     PSVT (paroxysmal supraventricular tachycardia)     Tobacco use 7/31/2015    Uncontrolled type 2 diabetes mellitus 8/1/2015     Past Surgical History:   Procedure Laterality Date    APPENDECTOMY      age 11 years    DEBRIDEMENT-WOUND N/A 9/24/2015    Performed by [facility] at [facility] 2ND FLR    FLUORO URODYNAMIC STUDY (FUDS) N/A 6/10/2015    Performed by Herminia Subramanian MD at [facility] OR 1ST FLR    HERNIA REPAIR      age late 20's- umbilical     LAMINECTOMY THORACIC N/A 8/13/2015    Performed by [facility] at [facility] OR 2ND FLR    REVISION-WOUND N/A 9/24/2015    Performed by [facility] at [facility] OR 2ND FLR    SPINE SURGERY  8/13/15    LAMI. & SPINAL TUMOR RESECTION/ YADAV    TUBAL LIGATION      age 25 years      Family History     Problem Relation (Age of Onset)    Aneurysm Maternal Aunt    Diabetes Mellitus Mother    Heart disease Father (54)    Lupus Maternal Aunt    Sleep apnea Mother        Tobacco Use    Smoking status: Former Smoker     Packs/day: 1.00    Smokeless tobacco: Current User    Tobacco comment: QUIT NOV 12TH, 2018   Substance and Sexual Activity    Alcohol use: No    Drug use: No    Sexual activity: Yes     Partners: Female     Review of Systems   Constitutional: no fever, chills or night sweats. No changes in weight   Eyes: no visual changes   ENT: no nasal congestion or sore throat   Respiratory: no cough or shortness of breath   Cardiovascular: no chest pain or palpitations   Gastrointestinal: no nausea or vomiting   Genitourinary: no hematuria or dysuria   Integument/Breast: no rash or pruritis   Hematologic/Lymphatic: no easy bruising or lymphadenopathy   Musculoskeletal: no arthralgias.  Positive for back pain.   Neurological: no seizures or tremors   Behavioral/Psych: no auditory or visual hallucinations   Endocrine: no heat or cold intolerance     Objective:     Weight: 112.9 kg (249 lb)  Body mass index is 42.74 kg/m².  Vital Signs (Most Recent):  Temp: 98.2 °F (36.8 °C) (03/25/19 0935)  Pulse: 85 (03/25/19 0935)  BP: (!) 142/81 (03/25/19 0935) Vital Signs (24h Range):  [unfilled]       Neurosurgery Physical Exam  General: well developed, well nourished, no distress. Obese.  Head: normocephalic, atraumatic  Neurologic: Alert and oriented. Thought content appropriate.  GCS: Motor: 6/Verbal: 5/Eyes: 4 GCS Total: 15  Mental Status: Awake, Alert, Oriented x 4  Language: No aphasia  Speech: No dysarthria  Cranial nerves: face symmetric, tongue midline, CN II-XII grossly intact.   Eyes: pupils equal, round, reactive to light with accomodation, EOMI.   Pulmonary: normal respirations, no signs of respiratory distress  Abdomen: soft, non-distended, not tender to palpation  Skin: Skin is warm, dry and intact.  Sensory: intact to light touch throughout    Motor Strength:Moves all extremities spontaneously with good tone. No abnormal movements seen.     Strength  Deltoids Triceps Biceps Wrist Extension Wrist Flexion Hand    Upper: R 5/5 5/5 5/5 5/5 5/5 5/5    L 5/5 5/5 5/5 5/5 5/5 5/5     Iliopsoas Quadriceps Knee  Flexion Tibialis  anterior Gastro- cnemius EHL   Lower: R 5/5 5/5 5/5 5/5 5/5 5/5    L 4+/5 5/5 5/5 2/5 5/5 2/5     Reflexes:   DTR: 2+ symmetrically throughout.  Segundo's: Negative.  Babinski's: Negative.  Clonus: Negative.     Cerebellar:   Finger-to-nose and rapid alternating movements normal.   Pronator drift: intact bilaterally  Gait stable, fluid.      Cervical:   ROM: Full with flexion, extension, lateral rotation and ear-to-shoulder bend.   Midline TTP: Negative.     Thoracic:  Midline TTP: Negative.     Lumbar:  Midline TTP: Negative.  Straight Leg Test: Negative.  Full ROM. No pain  with flexion or extension.       Significant Diagnostics:  MRI Lumbar Spine 3/25:  Impression       Unchanged nonenhancing cystic lesion within the distal conus, stable when compared to multiple prior studies.       I have reviewed all pertinent imaging results/findings within the past 24 hours.    Assessment/Plan:   Ms. Tiffanie Espinoza is a 47-year-old female with history of thoracic laminectomy at T[facility] for resection of intradural intramedullary thoracic cyst (w/ Dr. Yadav on 08/13/2015) w/wound dehiscence and washout/revision (11/24/15)  who presents for follow up of low back pain.     -Pt neurologically stable on exam. Symptoms are unchanged.  -MRI lumbar spine reviewed. No changes of known cystic lesion. No evidence of central canal or NF stenosis. No neurosurgical intervention indicated.  -Recommend follow up with Neurologist in [place] and obtain EMG for further evaluation (order printed for pt)  -Will give pt external PT referral   -Pt can follow up in clinic prn at this time. No evidence of surgical intervention needed. She can continue to follow up with PM for back pain.   -Plan was discussed with pt, and he/she is agreeable to plan. Pt was advised to call our clinic if they have any questions, concerns, or new/worsening symptoms.         Re Leavitt PA-C  Neurosurgery  [facility]th Fl

## 2019-09-01 ENCOUNTER — PATIENT MESSAGE (OUTPATIENT)
Dept: NEUROSURGERY | Facility: CLINIC | Age: 49
End: 2019-09-01

## 2019-09-03 ENCOUNTER — TELEPHONE (OUTPATIENT)
Dept: NEUROSURGERY | Facility: CLINIC | Age: 49
End: 2019-09-03

## 2019-09-03 NOTE — TELEPHONE ENCOUNTER
"Sw the Pt about her new symptoms that she states shes  experiencing now: BUE numbness,T-spine pain and LLEpain  When driving long periods of time makes lumbar pain  . I asked the Pt did she complete the directive from MORIAH REINOSO on visit 03/2019 there was a paper PT referral and the EMG order that was given to the Pt she said "no that was not done and the PT either  She stated she  feels that these are new issues".  I informed her that the next available appt date is 02/11/2020 @12:30pm and that I will talk to nurse Rosario about if any more imaging is needed in this situation because imaging was completed in 03/2019 as well per Marlene stated the this Pt needs to return to her PCP for a full work up  To see if this is something surgical for   Will call Pt with update .    "